# Patient Record
Sex: MALE | Race: WHITE | Employment: PART TIME | URBAN - METROPOLITAN AREA
[De-identification: names, ages, dates, MRNs, and addresses within clinical notes are randomized per-mention and may not be internally consistent; named-entity substitution may affect disease eponyms.]

---

## 2018-06-10 ENCOUNTER — HOSPITAL ENCOUNTER (EMERGENCY)
Age: 38
Discharge: HOME | End: 2018-06-10
Attending: EMERGENCY MEDICINE | Admitting: EMERGENCY MEDICINE
Payer: COMMERCIAL

## 2018-06-10 ENCOUNTER — APPOINTMENT (OUTPATIENT)
Dept: GENERAL RADIOLOGY | Age: 38
End: 2018-06-10
Attending: EMERGENCY MEDICINE
Payer: COMMERCIAL

## 2018-06-10 ENCOUNTER — APPOINTMENT (OUTPATIENT)
Dept: CT IMAGING | Age: 38
End: 2018-06-10
Attending: EMERGENCY MEDICINE
Payer: COMMERCIAL

## 2018-06-10 VITALS
DIASTOLIC BLOOD PRESSURE: 88 MMHG | WEIGHT: 220 LBS | HEART RATE: 69 BPM | TEMPERATURE: 98.9 F | HEIGHT: 68 IN | BODY MASS INDEX: 33.34 KG/M2 | SYSTOLIC BLOOD PRESSURE: 137 MMHG | OXYGEN SATURATION: 97 % | RESPIRATION RATE: 18 BRPM

## 2018-06-10 DIAGNOSIS — R07.9 CHEST PAIN, UNSPECIFIED TYPE: Primary | ICD-10-CM

## 2018-06-10 LAB
ALBUMIN SERPL-MCNC: 3.8 G/DL (ref 3.5–5)
ALBUMIN/GLOB SERPL: 1 {RATIO} (ref 1.1–2.2)
ALP SERPL-CCNC: 90 U/L (ref 45–117)
ALT SERPL-CCNC: 32 U/L (ref 12–78)
ANION GAP SERPL CALC-SCNC: 8 MMOL/L (ref 5–15)
AST SERPL-CCNC: 16 U/L (ref 15–37)
BASOPHILS # BLD: 0.1 K/UL (ref 0–0.1)
BASOPHILS NFR BLD: 0 % (ref 0–1)
BILIRUB SERPL-MCNC: 0.5 MG/DL (ref 0.2–1)
BUN SERPL-MCNC: 15 MG/DL (ref 6–20)
BUN/CREAT SERPL: 13 (ref 12–20)
CALCIUM SERPL-MCNC: 8.9 MG/DL (ref 8.5–10.1)
CHLORIDE SERPL-SCNC: 106 MMOL/L (ref 97–108)
CO2 SERPL-SCNC: 26 MMOL/L (ref 21–32)
CREAT SERPL-MCNC: 1.15 MG/DL (ref 0.7–1.3)
DIFFERENTIAL METHOD BLD: ABNORMAL
EOSINOPHIL # BLD: 0.3 K/UL (ref 0–0.4)
EOSINOPHIL NFR BLD: 2 % (ref 0–7)
ERYTHROCYTE [DISTWIDTH] IN BLOOD BY AUTOMATED COUNT: 12.9 % (ref 11.5–14.5)
GLOBULIN SER CALC-MCNC: 3.7 G/DL (ref 2–4)
GLUCOSE SERPL-MCNC: 117 MG/DL (ref 65–100)
HCT VFR BLD AUTO: 35.4 % (ref 36.6–50.3)
HGB BLD-MCNC: 12.5 G/DL (ref 12.1–17)
IMM GRANULOCYTES # BLD: 0.1 K/UL (ref 0–0.04)
IMM GRANULOCYTES NFR BLD AUTO: 1 % (ref 0–0.5)
LYMPHOCYTES # BLD: 3.6 K/UL (ref 0.8–3.5)
LYMPHOCYTES NFR BLD: 32 % (ref 12–49)
MCH RBC QN AUTO: 30.9 PG (ref 26–34)
MCHC RBC AUTO-ENTMCNC: 35.3 G/DL (ref 30–36.5)
MCV RBC AUTO: 87.6 FL (ref 80–99)
MONOCYTES # BLD: 0.9 K/UL (ref 0–1)
MONOCYTES NFR BLD: 8 % (ref 5–13)
NEUTS SEG # BLD: 6.5 K/UL (ref 1.8–8)
NEUTS SEG NFR BLD: 57 % (ref 32–75)
NRBC # BLD: 0 K/UL (ref 0–0.01)
NRBC BLD-RTO: 0 PER 100 WBC
PLATELET # BLD AUTO: 260 K/UL (ref 150–400)
PMV BLD AUTO: 10.4 FL (ref 8.9–12.9)
POTASSIUM SERPL-SCNC: 3.7 MMOL/L (ref 3.5–5.1)
PROT SERPL-MCNC: 7.5 G/DL (ref 6.4–8.2)
RBC # BLD AUTO: 4.04 M/UL (ref 4.1–5.7)
SODIUM SERPL-SCNC: 140 MMOL/L (ref 136–145)
TROPONIN I SERPL-MCNC: <0.04 NG/ML
WBC # BLD AUTO: 11.3 K/UL (ref 4.1–11.1)

## 2018-06-10 PROCEDURE — 85025 COMPLETE CBC W/AUTO DIFF WBC: CPT | Performed by: EMERGENCY MEDICINE

## 2018-06-10 PROCEDURE — 71275 CT ANGIOGRAPHY CHEST: CPT

## 2018-06-10 PROCEDURE — 93005 ELECTROCARDIOGRAM TRACING: CPT

## 2018-06-10 PROCEDURE — 74011250636 HC RX REV CODE- 250/636: Performed by: EMERGENCY MEDICINE

## 2018-06-10 PROCEDURE — 99285 EMERGENCY DEPT VISIT HI MDM: CPT

## 2018-06-10 PROCEDURE — 84484 ASSAY OF TROPONIN QUANT: CPT | Performed by: EMERGENCY MEDICINE

## 2018-06-10 PROCEDURE — 80053 COMPREHEN METABOLIC PANEL: CPT | Performed by: EMERGENCY MEDICINE

## 2018-06-10 PROCEDURE — 96374 THER/PROPH/DIAG INJ IV PUSH: CPT

## 2018-06-10 PROCEDURE — 36415 COLL VENOUS BLD VENIPUNCTURE: CPT | Performed by: EMERGENCY MEDICINE

## 2018-06-10 PROCEDURE — 74011636320 HC RX REV CODE- 636/320: Performed by: RADIOLOGY

## 2018-06-10 PROCEDURE — 71046 X-RAY EXAM CHEST 2 VIEWS: CPT

## 2018-06-10 RX ORDER — DICLOFENAC SODIUM 75 MG/1
75 TABLET, DELAYED RELEASE ORAL 2 TIMES DAILY
Qty: 30 TAB | Refills: 0 | Status: SHIPPED | OUTPATIENT
Start: 2018-06-10

## 2018-06-10 RX ORDER — CYCLOBENZAPRINE HCL 10 MG
10 TABLET ORAL
Qty: 20 TAB | Refills: 0 | Status: SHIPPED | OUTPATIENT
Start: 2018-06-10

## 2018-06-10 RX ORDER — KETOROLAC TROMETHAMINE 30 MG/ML
30 INJECTION, SOLUTION INTRAMUSCULAR; INTRAVENOUS
Status: COMPLETED | OUTPATIENT
Start: 2018-06-10 | End: 2018-06-10

## 2018-06-10 RX ADMIN — KETOROLAC TROMETHAMINE 30 MG: 30 INJECTION, SOLUTION INTRAMUSCULAR at 20:34

## 2018-06-10 RX ADMIN — IOPAMIDOL 84 ML: 755 INJECTION, SOLUTION INTRAVENOUS at 21:05

## 2018-06-10 NOTE — LETTER
[facility] 
[facility] EMERGENCY DEPT 
[address] [facility] [place] [id number] [phone] Work/School Note Date: 6/10/2018 To Whom It May concern: 
 
Samantha Feliz was seen and treated today in the emergency room by the following provider(s): 
Attending Provider: Dona Garcia DO. Samantha Feliz may return to work on 6/12/2018. Sincerely, Dona Garcia DO

## 2018-06-11 LAB
ATRIAL RATE: 72 BPM
CALCULATED P AXIS, ECG09: 40 DEGREES
CALCULATED R AXIS, ECG10: 46 DEGREES
CALCULATED T AXIS, ECG11: 34 DEGREES
DIAGNOSIS, 93000: NORMAL
P-R INTERVAL, ECG05: 198 MS
Q-T INTERVAL, ECG07: 370 MS
QRS DURATION, ECG06: 110 MS
QTC CALCULATION (BEZET), ECG08: 405 MS
VENTRICULAR RATE, ECG03: 72 BPM

## 2018-06-11 NOTE — ED NOTES
Pt reported upper back pain began yesterday, followed by the chest pain. Denies nausea/vomiting, but does report shortness of breath. Pain increases when lying down in a supine position. Denies increased pain with movement. Pt does report daily marijuana use. Denies any chronic medical problems.

## 2018-06-11 NOTE — ED PROVIDER NOTES
HPI Comments: Patient is a 30year old male without any past medical history or past surgical history significant for  who presents ambulatory to the ED with a chief complaint of non-radiating, rt sided chest pain onset yesterday. Pt states that he began having cp yesterday and also c/o back pain. He reports that his cp is exacerbated with movement and palpation. Pt denies n/v, but reports sob. He states his pain is exacerbated with lying flat. Of note, pt reports daily marijuana use. No other medical complaints or acute sx at this time. Signed by: robert Becker for Wendy Baker on Mayelin 10, 2018. The history is provided by the patient. A  was used. History reviewed. No pertinent past medical history. Past Surgical History:   Procedure Laterality Date    HX ORTHOPAEDIC      left knee meniscus repair, ACL repair         History reviewed. No pertinent family history. Social History     Social History    Marital status: SINGLE     Spouse name: N/A    Number of children: N/A    Years of education: N/A     Occupational History    Not on file. Social History Main Topics    Smoking status: Former Smoker     Quit date: 9/10/2015    Smokeless tobacco: Never Used    Alcohol use 4.8 oz/week     8 Cans of beer per week      Comment: once/week    Drug use: Yes     Special: Marijuana      Comment: \"daily\"    Sexual activity: Not on file     Other Topics Concern    Not on file     Social History Narrative    No narrative on file     ALLERGIES: Review of patient's allergies indicates no known allergies. Review of Systems   Constitutional: Negative for chills and fever. Respiratory: Positive for shortness of breath. Cardiovascular: Positive for chest pain. Gastrointestinal: Negative for abdominal pain, constipation, diarrhea and vomiting. Musculoskeletal: Positive for back pain. Negative for neck pain.    Neurological: Negative for dizziness and light-headedness. All other systems reviewed and are negative. Vitals:    06/10/18 2033 06/10/18 2045 06/10/18 2111 06/10/18 2112   BP:  127/86 137/88    Pulse: 65 78  69   Resp:       Temp:       SpO2: 100% 96%  97%   Weight:       Height:                Physical Exam   Constitutional: He is oriented to person, place, and time. He appears well-developed and well-nourished. No distress. HENT:   Head: Normocephalic and atraumatic. Nose: Nose normal.   Mouth/Throat: Oropharynx is clear and moist.   Eyes: Conjunctivae and EOM are normal. Pupils are equal, round, and reactive to light. No scleral icterus. Neck: Normal range of motion. Neck supple. No JVD present. No tracheal deviation present. No thyromegaly present. No carotid bruits noted. Cardiovascular: Normal rate, regular rhythm, normal heart sounds and intact distal pulses. Exam reveals no gallop and no friction rub. No murmur heard. Pulmonary/Chest: Effort normal and breath sounds normal. No respiratory distress. He has no wheezes. He has no rales. He exhibits tenderness (right anterior chest wall tenderness). Abdominal: Soft. Bowel sounds are normal. He exhibits no distension and no mass. There is no tenderness. There is no rebound and no guarding. Musculoskeletal: Normal range of motion. He exhibits no edema or tenderness. Lymphadenopathy:     He has no cervical adenopathy. Neurological: He is alert and oriented to person, place, and time. He has normal reflexes. No cranial nerve deficit. Coordination normal.   Skin: Skin is warm and dry. No rash noted. No erythema. Psychiatric: He has a normal mood and affect. His behavior is normal. Judgment and thought content normal.   Nursing note and vitals reviewed.        MDM  Number of Diagnoses or Management Options  Chest pain, unspecified type:      Amount and/or Complexity of Data Reviewed  Clinical lab tests: ordered and reviewed  Tests in the radiology section of CPT®: ordered and reviewed  Tests in the medicine section of CPT®: ordered and reviewed  Independent visualization of images, tracings, or specimens: yes (ekg)    Risk of Complications, Morbidity, and/or Mortality  Presenting problems: moderate  Diagnostic procedures: low  Management options: moderate    Patient Progress  Patient progress: stable        ED Course       Procedures    Chief Complaint   Patient presents with    Chest Pain       The patient's presenting problems have been discussed, and they are in agreement with the care plan formulated and outlined with them. I have encouraged them to ask questions as they arise throughout their visit. MEDICATIONS GIVEN:  Medications   ketorolac (TORADOL) injection 30 mg (30 mg IntraVENous Given 6/10/18 2034)   iopamidol (ISOVUE-370) 76 % injection 100 mL (84 mL IntraVENous Given 6/10/18 2105)       LABS REVIEWED:  Recent Results (from the past 24 hour(s))   TROPONIN I    Collection Time: 06/10/18  7:29 PM   Result Value Ref Range    Troponin-I, Qt. <0.04 <0.05 ng/mL   CBC WITH AUTOMATED DIFF    Collection Time: 06/10/18  7:29 PM   Result Value Ref Range    WBC 11.3 (H) 4.1 - 11.1 K/uL    RBC 4.04 (L) 4.10 - 5.70 M/uL    HGB 12.5 12.1 - 17.0 g/dL    HCT 35.4 (L) 36.6 - 50.3 %    MCV 87.6 80.0 - 99.0 FL    MCH 30.9 26.0 - 34.0 PG    MCHC 35.3 30.0 - 36.5 g/dL    RDW 12.9 11.5 - 14.5 %    PLATELET 102 453 - 037 K/uL    MPV 10.4 8.9 - 12.9 FL    NRBC 0.0 0  WBC    ABSOLUTE NRBC 0.00 0.00 - 0.01 K/uL    NEUTROPHILS 57 32 - 75 %    LYMPHOCYTES 32 12 - 49 %    MONOCYTES 8 5 - 13 %    EOSINOPHILS 2 0 - 7 %    BASOPHILS 0 0 - 1 %    IMMATURE GRANULOCYTES 1 (H) 0.0 - 0.5 %    ABS. NEUTROPHILS 6.5 1.8 - 8.0 K/UL    ABS. LYMPHOCYTES 3.6 (H) 0.8 - 3.5 K/UL    ABS. MONOCYTES 0.9 0.0 - 1.0 K/UL    ABS. EOSINOPHILS 0.3 0.0 - 0.4 K/UL    ABS. BASOPHILS 0.1 0.0 - 0.1 K/UL    ABS. IMM.  GRANS. 0.1 (H) 0.00 - 0.04 K/UL    DF AUTOMATED     METABOLIC PANEL, COMPREHENSIVE Collection Time: 06/10/18  7:29 PM   Result Value Ref Range    Sodium 140 136 - 145 mmol/L    Potassium 3.7 3.5 - 5.1 mmol/L    Chloride 106 97 - 108 mmol/L    CO2 26 21 - 32 mmol/L    Anion gap 8 5 - 15 mmol/L    Glucose 117 (H) 65 - 100 mg/dL    BUN 15 6 - 20 MG/DL    Creatinine 1.15 0.70 - 1.30 MG/DL    BUN/Creatinine ratio 13 12 - 20      GFR est AA >60 >60 ml/min/1.73m2    GFR est non-AA >60 >60 ml/min/1.73m2    Calcium 8.9 8.5 - 10.1 MG/DL    Bilirubin, total 0.5 0.2 - 1.0 MG/DL    ALT (SGPT) 32 12 - 78 U/L    AST (SGOT) 16 15 - 37 U/L    Alk. phosphatase 90 45 - 117 U/L    Protein, total 7.5 6.4 - 8.2 g/dL    Albumin 3.8 3.5 - 5.0 g/dL    Globulin 3.7 2.0 - 4.0 g/dL    A-G Ratio 1.0 (L) 1.1 - 2.2         VITAL SIGNS:  Patient Vitals for the past 12 hrs:   Temp Pulse Resp BP SpO2   06/10/18 2112 - 69 - - 97 %   06/10/18 2111 - - - 137/88 -   06/10/18 2045 - 78 - 127/86 96 %   06/10/18 2033 - 65 - - 100 %   06/10/18 2031 - - - 164/76 100 %   06/10/18 1937 98.9 °F (37.2 °C) 73 18 156/83 97 %       RADIOLOGY RESULTS:  The following have been ordered and reviewed:  Xr Chest Pa Lat    Result Date: 6/10/2018  EXAM:  XR CHEST PA LAT INDICATION:  Chest pain. COMPARISON: 1/7/2009 TECHNIQUE: PA and lateral chest views FINDINGS: The cardia mediastinal contours are stable. The pulmonary vasculature is within normal limits. The lungs and pleural spaces are clear. There is no pneumothorax. The bones and upper abdomen are stable. IMPRESSION: No acute process. Stable exam.     Cta Chest W Or W Wo Cont    Result Date: 6/10/2018  EXAM:  CTA CHEST W OR W WO CONT INDICATION:  Chest pain and shortness of breath. COMPARISON: Chest radiographs 6/72/9492 TECHNIQUE: Helical thin section chest CT following uneventful intravenous administration of nonionic contrast according to departmental PE protocol. Coronal and sagittal reformats were performed. 3D/MIP post processing was performed.  CT dose reduction was achieved through use of a standardized protocol tailored for this examination and automatic exposure control for dose modulation. FINDINGS: This is a good quality study for the evaluation of pulmonary embolism to the first subsegmental arterial level. There is no pulmonary embolism to this level. The visualized thyroid gland is unremarkable. The aorta and main pulmonary artery are normal in caliber. Cardiac size is within normal limits. No pericardial effusion. No lymphadenopathy by imaging size criteria. There is mild bilateral lower lung atelectasis. There is no lung nodule, mass, or consolidation. No pleural effusion or pneumothorax. Central airways are unremarkable. Limited images of the upper abdomen are within normal limits. The bony structures are age-appropriate     IMPRESSION: There is no acute pulmonary embolism or other acute abnormality in the chest.       ED EKG interpretation:  Rhythm: normal sinus rhythm; and regular . Rate (approx.): 72; Axis: normal; P wave: normal; QRS interval: normal ; ST/T wave: normal; Other findings: normal. This EKG was interpreted by Harjinder Iraheta DO, ED Provider. PROGRESS NOTES:  Discussed results and plan with patient. Patient will be discharged home with PCP followup. Patient instructed to return to the emergency room for any worsening symptoms or any other concerns. DIAGNOSIS:    1.  Chest pain, unspecified type        PLAN:  Follow-up Information     Follow up With Details Comments Contact Info    Trevor London MD Schedule an appointment as soon as possible for a visit  [address] [place] [address] [address] Mary [address] [place] [address]  [phone]      [facility] EMERGENCY DEPT  If symptoms worsen [address] [place] [address]  [phone]        Discharge Medication List as of 6/10/2018  9:21 PM      START taking these medications    Details   diclofenac EC (VOLTAREN) 75 mg EC tablet Take 1 Tab by mouth two (2) times a day., Print, Disp-30 Tab, R-0      cyclobenzaprine (FLEXERIL) 10 mg tablet Take 1 Tab by mouth three (3) times daily as needed for Muscle Spasm(s). , Print, Disp-20 Tab, R-0             ED COURSE: The patient's hospital course has been uncomplicated.

## 2018-06-11 NOTE — ED NOTES
Pt reports improvement in chest pain at 6/10. Denies shortness of breath. Ambulatory to exit with family at bedside. Pt received discharge papers from provider.

## 2018-06-11 NOTE — DISCHARGE INSTRUCTIONS
We hope that we have addressed all of your medical concerns. The examination and treatment you received in the Emergency Department were for an emergent problem and were not intended as complete care. It is important that you follow up with your healthcare provider(s) for ongoing care. If your symptoms worsen or do not improve as expected, and you are unable to reach your usual health care provider(s), you should return to the Emergency Department. Today's healthcare is undergoing tremendous change, and patient satisfaction surveys are one of the many tools to assess the quality of medical care. You may receive a survey from the [employer] organization regarding your experience in the Emergency Department. I hope that your experience has been completely positive, particularly the medical care that I provided. As such, please participate in the survey; anything less than excellent does not meet my expectations or intentions. [facility] and 8 [facility] participate in nationally recognized quality of care measures. If your blood pressure is greater than 120/80, as reported below, we urge that you seek medical care to address the potential of high blood pressure, commonly known as hypertension. Hypertension can be hereditary or can be caused by certain medical conditions, pain, stress, or \"white coat syndrome. \"       Please make an appointment with your health care provider(s) for follow up of your Emergency Department visit. VITALS:   Patient Vitals for the past 8 hrs:   Temp Pulse Resp BP SpO2   06/10/18 2112 - 69 - - 97 %   06/10/18 2111 - - - 137/88 -   06/10/18 2045 - 78 - 127/86 96 %   06/10/18 2033 - 65 - - 100 %   06/10/18 2031 - - - 164/76 100 %   06/10/18 1937 98.9 °F (37.2 °C) 73 18 156/83 97 %          Thank you for allowing us to provide you with medical care today. We realize that you have many choices for your emergency care needs. Please choose us in the future for any continued health care needs. [address] [place] [address] [address]   Office: [phone]            Recent Results (from the past 24 hour(s))   TROPONIN I    Collection Time: 06/10/18  7:29 PM   Result Value Ref Range    Troponin-I, Qt. <0.04 <0.05 ng/mL   CBC WITH AUTOMATED DIFF    Collection Time: 06/10/18  7:29 PM   Result Value Ref Range    WBC 11.3 (H) 4.1 - 11.1 K/uL    RBC 4.04 (L) 4.10 - 5.70 M/uL    HGB 12.5 12.1 - 17.0 g/dL    HCT 35.4 (L) 36.6 - 50.3 %    MCV 87.6 80.0 - 99.0 FL    MCH 30.9 26.0 - 34.0 PG    MCHC 35.3 30.0 - 36.5 g/dL    RDW 12.9 11.5 - 14.5 %    PLATELET 316 985 - 497 K/uL    MPV 10.4 8.9 - 12.9 FL    NRBC 0.0 0  WBC    ABSOLUTE NRBC 0.00 0.00 - 0.01 K/uL    NEUTROPHILS 57 32 - 75 %    LYMPHOCYTES 32 12 - 49 %    MONOCYTES 8 5 - 13 %    EOSINOPHILS 2 0 - 7 %    BASOPHILS 0 0 - 1 %    IMMATURE GRANULOCYTES 1 (H) 0.0 - 0.5 %    ABS. NEUTROPHILS 6.5 1.8 - 8.0 K/UL    ABS. LYMPHOCYTES 3.6 (H) 0.8 - 3.5 K/UL    ABS. MONOCYTES 0.9 0.0 - 1.0 K/UL    ABS. EOSINOPHILS 0.3 0.0 - 0.4 K/UL    ABS. BASOPHILS 0.1 0.0 - 0.1 K/UL    ABS. IMM. GRANS. 0.1 (H) 0.00 - 0.04 K/UL    DF AUTOMATED     METABOLIC PANEL, COMPREHENSIVE    Collection Time: 06/10/18  7:29 PM   Result Value Ref Range    Sodium 140 136 - 145 mmol/L    Potassium 3.7 3.5 - 5.1 mmol/L    Chloride 106 97 - 108 mmol/L    CO2 26 21 - 32 mmol/L    Anion gap 8 5 - 15 mmol/L    Glucose 117 (H) 65 - 100 mg/dL    BUN 15 6 - 20 MG/DL    Creatinine 1.15 0.70 - 1.30 MG/DL    BUN/Creatinine ratio 13 12 - 20      GFR est AA >60 >60 ml/min/1.73m2    GFR est non-AA >60 >60 ml/min/1.73m2    Calcium 8.9 8.5 - 10.1 MG/DL    Bilirubin, total 0.5 0.2 - 1.0 MG/DL    ALT (SGPT) 32 12 - 78 U/L    AST (SGOT) 16 15 - 37 U/L    Alk.  phosphatase 90 45 - 117 U/L    Protein, total 7.5 6.4 - 8.2 g/dL    Albumin 3.8 3.5 - 5.0 g/dL    Globulin 3.7 2.0 - 4.0 g/dL    A-G Ratio 1.0 (L) 1.1 - 2.2         Xr Chest Pa Lat    Result Date: 6/10/2018  EXAM:  XR CHEST PA LAT INDICATION:  Chest pain. COMPARISON: 1/7/2009 TECHNIQUE: PA and lateral chest views FINDINGS: The cardia mediastinal contours are stable. The pulmonary vasculature is within normal limits. The lungs and pleural spaces are clear. There is no pneumothorax. The bones and upper abdomen are stable. IMPRESSION: No acute process. Stable exam.     Cta Chest W Or W Wo Cont    Result Date: 6/10/2018  EXAM:  CTA CHEST W OR W WO CONT INDICATION:  Chest pain and shortness of breath. COMPARISON: Chest radiographs 1/75/2880 TECHNIQUE: Helical thin section chest CT following uneventful intravenous administration of nonionic contrast according to departmental PE protocol. Coronal and sagittal reformats were performed. 3D/MIP post processing was performed. CT dose reduction was achieved through use of a standardized protocol tailored for this examination and automatic exposure control for dose modulation. FINDINGS: This is a good quality study for the evaluation of pulmonary embolism to the first subsegmental arterial level. There is no pulmonary embolism to this level. The visualized thyroid gland is unremarkable. The aorta and main pulmonary artery are normal in caliber. Cardiac size is within normal limits. No pericardial effusion. No lymphadenopathy by imaging size criteria. There is mild bilateral lower lung atelectasis. There is no lung nodule, mass, or consolidation. No pleural effusion or pneumothorax. Central airways are unremarkable. Limited images of the upper abdomen are within normal limits. The bony structures are age-appropriate     IMPRESSION: There is no acute pulmonary embolism or other acute abnormality in the chest.              Chest Pain: Care Instructions  Your Care Instructions    There are many things that can cause chest pain. Some are not serious and will get better on their own in a few days.  But some kinds of chest pain need more testing and treatment. Your doctor may have recommended a follow-up visit in the next 8 to 12 hours. If you are not getting better, you may need more tests or treatment. Even though your doctor has released you, you still need to watch for any problems. The doctor carefully checked you, but sometimes problems can develop later. If you have new symptoms or if your symptoms do not get better, get medical care right away. If you have worse or different chest pain or pressure that lasts more than 5 minutes or you passed out (lost consciousness), call 911 or seek other emergency help right away. A medical visit is only one step in your treatment. Even if you feel better, you still need to do what your doctor recommends, such as going to all suggested follow-up appointments and taking medicines exactly as directed. This will help you recover and help prevent future problems. How can you care for yourself at home? · Rest until you feel better. · Take your medicine exactly as prescribed. Call your doctor if you think you are having a problem with your medicine. · Do not drive after taking a prescription pain medicine. When should you call for help? Call 911 if:  ? · You passed out (lost consciousness). ? · You have severe difficulty breathing. ? · You have symptoms of a heart attack. These may include:  ¨ Chest pain or pressure, or a strange feeling in your chest.  ¨ Sweating. ¨ Shortness of breath. ¨ Nausea or vomiting. ¨ Pain, pressure, or a strange feeling in your back, neck, jaw, or upper belly or in one or both shoulders or arms. ¨ Lightheadedness or sudden weakness. ¨ A fast or irregular heartbeat. After you call 911, the  may tell you to chew 1 adult-strength or 2 to 4 low-dose aspirin. Wait for an ambulance. Do not try to drive yourself. ?Call your doctor today if:  ? · You have any trouble breathing. ? · Your chest pain gets worse.    ? · You are dizzy or lightheaded, or you feel like you may faint. ? · You are not getting better as expected. ? · You are having new or different chest pain. Where can you learn more? Go to http://claudia-merrill.info/. Enter A120 in the search box to learn more about \"Chest Pain: Care Instructions. \"  Current as of: March 20, 2017  Content Version: 11.4  © 2006-2017 [employer]. Care instructions adapted under license by [employer] (which disclaims liability or warranty for this information). If you have questions about a medical condition or this instruction, always ask your healthcare professional. [place] [address] any warranty or liability for your use of this information. Musculoskeletal Chest Pain: Care Instructions  Your Care Instructions    Chest pain is not always a sign that something is wrong with your heart or that you have another serious problem. The doctor thinks your chest pain is caused by strained muscles or ligaments, inflamed chest cartilage, or another problem in your chest, rather than by your heart. You may need more tests to find the cause of your chest pain. Follow-up care is a key part of your treatment and safety. Be sure to make and go to all appointments, and call your doctor if you are having problems. It's also a good idea to know your test results and keep a list of the medicines you take. How can you care for yourself at home? · Take pain medicines exactly as directed. ¨ If the doctor gave you a prescription medicine for pain, take it as prescribed. ¨ If you are not taking a prescription pain medicine, ask your doctor if you can take an over-the-counter medicine. · Rest and protect the sore area. · Stop, change, or take a break from any activity that may be causing your pain or soreness. · Put ice or a cold pack on the sore area for 10 to 20 minutes at a time.  Try to do this every 1 to 2 hours for the next 3 days (when you are awake) or until the swelling goes down. Put a thin cloth between the ice and your skin. · After 2 or 3 days, apply a heating pad set on low or a warm cloth to the area that hurts. Some doctors suggest that you go back and forth between hot and cold. · Do not wrap or tape your ribs for support. This may cause you to take smaller breaths, which could increase your risk of lung problems. · Mentholated creams such as Bengay or Icy Hot may soothe sore muscles. Follow the instructions on the package. · Follow your doctor's instructions for exercising. · Gentle stretching and massage may help you get better faster. Stretch slowly to the point just before pain begins, and hold the stretch for at least 15 to 30 seconds. Do this 3 or 4 times a day. Stretch just after you have applied heat. · As your pain gets better, slowly return to your normal activities. Any increased pain may be a sign that you need to rest a while longer. When should you call for help? Call 911 anytime you think you may need emergency care. For example, call if:  ? · You have chest pain or pressure. This may occur with:  ¨ Sweating. ¨ Shortness of breath. ¨ Nausea or vomiting. ¨ Pain that spreads from the chest to the neck, jaw, or one or both shoulders or arms. ¨ Dizziness or lightheadedness. ¨ A fast or uneven pulse. After calling 911, chew 1 adult-strength aspirin. Wait for an ambulance. Do not try to drive yourself. ? · You have sudden chest pain and shortness of breath, or you cough up blood. ?Call your doctor now or seek immediate medical care if:  ? · You have any trouble breathing. ? · Your chest pain gets worse. ? · Your chest pain occurs consistently with exercise and is relieved by rest.   ? Watch closely for changes in your health, and be sure to contact your doctor if:  ? · Your chest pain does not get better after 1 week. Where can you learn more? Go to http://claudia-merrill.info/.   Enter J681 in the search box to learn more about \"Musculoskeletal Chest Pain: Care Instructions. \"  Current as of: March 20, 2017  Content Version: 11.4  © 7913-1494 [employer]. Care instructions adapted under license by [employer] (which disclaims liability or warranty for this information). If you have questions about a medical condition or this instruction, always ask your healthcare professional. [place] [address] any warranty or liability for your use of this information.

## 2021-07-12 ENCOUNTER — TRANSCRIBE ORDER (OUTPATIENT)
Dept: SCHEDULING | Age: 41
End: 2021-07-12

## 2021-07-12 DIAGNOSIS — M25.561 RIGHT KNEE PAIN: Primary | ICD-10-CM

## 2021-07-19 ENCOUNTER — HOSPITAL ENCOUNTER (OUTPATIENT)
Dept: MRI IMAGING | Age: 41
Discharge: HOME | End: 2021-07-19
Attending: FAMILY MEDICINE
Payer: COMMERCIAL

## 2021-07-19 DIAGNOSIS — M25.561 RIGHT KNEE PAIN: ICD-10-CM

## 2021-07-19 PROCEDURE — 73721 MRI JNT OF LWR EXTRE W/O DYE: CPT

## 2022-11-22 ENCOUNTER — HOSPITAL ENCOUNTER (EMERGENCY)
Age: 42
Discharge: HOME | End: 2022-11-22
Payer: COMMERCIAL

## 2022-11-22 VITALS
WEIGHT: 250 LBS | RESPIRATION RATE: 18 BRPM | BODY MASS INDEX: 37.89 KG/M2 | HEART RATE: 81 BPM | HEIGHT: 68 IN | OXYGEN SATURATION: 98 % | DIASTOLIC BLOOD PRESSURE: 70 MMHG | TEMPERATURE: 98.2 F | SYSTOLIC BLOOD PRESSURE: 153 MMHG

## 2022-11-22 DIAGNOSIS — S05.02XA ABRASION OF LEFT CORNEA, INITIAL ENCOUNTER: Primary | ICD-10-CM

## 2022-11-22 PROCEDURE — 90714 TD VACC NO PRESV 7 YRS+ IM: CPT | Performed by: PHYSICIAN ASSISTANT

## 2022-11-22 PROCEDURE — 99284 EMERGENCY DEPT VISIT MOD MDM: CPT

## 2022-11-22 PROCEDURE — 74011000250 HC RX REV CODE- 250: Performed by: EMERGENCY MEDICINE

## 2022-11-22 PROCEDURE — 90471 IMMUNIZATION ADMIN: CPT

## 2022-11-22 PROCEDURE — 74011250636 HC RX REV CODE- 250/636: Performed by: PHYSICIAN ASSISTANT

## 2022-11-22 RX ORDER — TETRACAINE HYDROCHLORIDE 5 MG/ML
1 SOLUTION OPHTHALMIC
Status: COMPLETED | OUTPATIENT
Start: 2022-11-22 | End: 2022-11-22

## 2022-11-22 RX ORDER — CIPROFLOXACIN HYDROCHLORIDE 3.5 MG/ML
2 SOLUTION/ DROPS TOPICAL 4 TIMES DAILY
Qty: 5 ML | Refills: 0 | Status: SHIPPED | OUTPATIENT
Start: 2022-11-22 | End: 2022-11-27

## 2022-11-22 RX ADMIN — FLUORESCEIN SODIUM 1 STRIP: 1 STRIP OPHTHALMIC at 14:03

## 2022-11-22 RX ADMIN — TETANUS AND DIPHTHERIA TOXOIDS ADSORBED 0.5 ML: 2; 2 INJECTION INTRAMUSCULAR at 14:41

## 2022-11-22 RX ADMIN — TETRACAINE HYDROCHLORIDE 1 DROP: 5 SOLUTION OPHTHALMIC at 14:03

## 2022-11-22 NOTE — ED PROVIDER NOTES
EMERGENCY DEPARTMENT HISTORY AND PHYSICAL EXAM      Date: 2022  Patient Name: Mabel Rose    History of Presenting Illness     Chief Complaint   Patient presents with    Eye Pain       History Provided By: Patient    HPI: Mabel Rose, 46 y.o. male with no significant past medical history presents this ED with cc of left eye pain. Patient acute onset of pain 1 hour prior to arrival while at work. States that he felt he had sensation of foreign body in left eye and proceeded to rub his eye which exacerbated his pain. States that he also flushed the eye out prior to arrival but continued to have sensation of foreign body and pain. He also complains of watery discharge from the eye. Denies any changes in vision. Denies any discharge, itching, redness. Denies contact lens use. Patient unsure of last tetanus immunization but suspects it was greater than 10 years ago. Notes no additional alleviating or exacerbating factors. Denies treating symptoms with anything. There are no other complaints, changes, or physical findings at this time. Past History     Past Medical History:  History reviewed. No pertinent past medical history. Past Surgical History:  Past Surgical History:   Procedure Laterality Date    HX ORTHOPAEDIC      left knee meniscus repair, ACL repair       Family History:  History reviewed. No pertinent family history. Social History:  Social History     Tobacco Use    Smoking status: Former     Types: Cigarettes     Quit date: 9/10/2015     Years since quittin.2    Smokeless tobacco: Never   Substance Use Topics    Alcohol use: Yes     Alcohol/week: 8.0 standard drinks     Types: 8 Cans of beer per week     Comment: once/week    Drug use: Yes     Types: Marijuana     Comment: \"daily\"       Allergies:  No Known Allergies    PCP: Wil Gross PA-C    No current facility-administered medications on file prior to encounter.      Current Outpatient Medications on File Prior to Encounter   Medication Sig Dispense Refill    [DISCONTINUED] diclofenac EC (VOLTAREN) 75 mg EC tablet Take 1 Tab by mouth two (2) times a day. 30 Tab 0    [DISCONTINUED] cyclobenzaprine (FLEXERIL) 10 mg tablet Take 1 Tab by mouth three (3) times daily as needed for Muscle Spasm(s). 20 Tab 0       Review of Systems   Review of Systems   Constitutional: Negative. Negative for chills, diaphoresis and fever. HENT: Negative. Negative for congestion, rhinorrhea and sore throat. Eyes:  Positive for pain and discharge. Negative for photophobia, redness, itching and visual disturbance. Respiratory: Negative. Negative for cough, chest tightness, shortness of breath and wheezing. Cardiovascular: Negative. Negative for chest pain and palpitations. Gastrointestinal: Negative. Negative for abdominal pain, diarrhea, nausea and vomiting. Genitourinary: Negative. Negative for difficulty urinating, dysuria, frequency and hematuria. Musculoskeletal: Negative. Skin: Negative. Negative for rash. Neurological: Negative. Negative for dizziness, syncope, weakness, light-headedness, numbness and headaches. Psychiatric/Behavioral: Negative. All other systems reviewed and are negative. Physical Exam   Physical Exam  Vitals and nursing note reviewed. Constitutional:       General: He is not in acute distress. Appearance: Normal appearance. He is obese. He is not ill-appearing or toxic-appearing. HENT:      Head: Normocephalic and atraumatic. Mouth/Throat:      Mouth: Mucous membranes are moist.   Eyes:      General: Lids are everted, no foreign bodies appreciated. Vision grossly intact. Left eye: Discharge (clear) present. No foreign body. Extraocular Movements: Extraocular movements intact. Left eye: Normal extraocular motion. Conjunctiva/sclera: Conjunctivae normal.      Left eye: Left conjunctiva is not injected. No exudate.      Pupils: Pupils are equal, round, and reactive to light. Left eye: Corneal abrasion (4 o'clock position) and fluorescein uptake present. Gianna exam negative. Slit lamp exam:     Left eye: No corneal ulcer or foreign body. Cardiovascular:      Rate and Rhythm: Normal rate and regular rhythm. Pulses: Normal pulses. Heart sounds: Normal heart sounds. No murmur heard. No friction rub. No gallop. Pulmonary:      Effort: Pulmonary effort is normal. No respiratory distress. Breath sounds: Normal breath sounds. No wheezing, rhonchi or rales. Musculoskeletal:      Cervical back: Neck supple. No tenderness. Lymphadenopathy:      Cervical: No cervical adenopathy. Skin:     General: Skin is warm and dry. Findings: No rash. Neurological:      General: No focal deficit present. Mental Status: He is alert and oriented to person, place, and time. Lab and Diagnostic Study Results   Labs -   No results found for this or any previous visit (from the past 12 hour(s)). Radiologic Studies -   @lastxrresult@  CT Results  (Last 48 hours)      None          CXR Results  (Last 48 hours)      None            Medical Decision Making and ED Course   Differential Diagnosis & Medical Decision Making Provider Note:   Ddx: Corneal abrasion versus ulceration, foreign body, iritis, conjunctivitis, keratitis    - I am the first provider for this patient. I reviewed the vital signs, available nursing notes, past medical history, past surgical history, family history and social history. The patients presenting problems have been discussed, and they are in agreement with the care plan formulated and outlined with them. I have encouraged them to ask questions as they arise throughout their visit. Vital Signs-Reviewed the patient's vital signs.   Patient Vitals for the past 12 hrs:   Temp Pulse Resp BP SpO2   11/22/22 1358 -- -- -- (!) 153/70 --   11/22/22 1355 98.2 °F (36.8 °C) 81 18 -- 98 %          ALCOHOL/SUBSTANCE ABUSE COUNSELING: Upon evaluation, pt endorsed recent alcohol/illicit drug use. For approximately 15 minutes, pt has been counseled on the dangers of alcohol and illicit drug use on their health, and they were encouraged to quit as soon as possible in order to decrease further risks to their health. Pt has conveyed their understanding of the risks involved should they continue to use these products. Procedures   Performed by: Shakir Perry PA-C  Procedures    Procedure Note - Wood's lamp exam:  2:39 PM  Performed by: Shakir Perry PA-C  Pts left eye was anesthetized with tetracaine, stained with fluorescein, and examined with a Wood's lamp, using lid eversion. Foreign body: no  Fluorescein uptake: yes, showing corneal abrasion at the 4 o'clock position  The procedure took 1-15 minutes, and pt tolerated well. Disposition   Disposition: DC- Adult Discharges: All of the diagnostic tests were reviewed and questions answered. Diagnosis, care plan and treatment options were discussed. The patient understands the instructions and will follow up as directed. The patients results have been reviewed with them. They have been counseled regarding their diagnosis. The patient verbally convey understanding and agreement of the signs, symptoms, diagnosis, treatment and prognosis and additionally agrees to follow up as recommended with their PCP in 24 - 48 hours. They also agree with the care-plan and convey that all of their questions have been answered. I have also put together some discharge instructions for them that include: 1) educational information regarding their diagnosis, 2) how to care for their diagnosis at home, as well a 3) list of reasons why they would want to return to the ED prior to their follow-up appointment, should their condition change. DISCHARGE PLAN:  1. There are no discharge medications for this patient.     2.   Follow-up Information       Follow up With Specialties Details Why [address] [facility]  Schedule an appointment as soon as possible for a visit  As needed if symptoms persist [address] [facility] [address]    [address] [facility]  If symptoms worsen [address]  [phone]          3. Return to ED if worse   4. Current Discharge Medication List        START taking these medications    Details   ciprofloxacin HCl (CILOXAN) 0.3 % ophthalmic solution Administer 2 Drops to left eye four (4) times daily for 5 days. Qty: 5 mL, Refills: 0  Start date: 11/22/2022, End date: 11/27/2022           Remove if admitted/transferred    Diagnosis/Clinical Impression     Clinical Impression:   1. Abrasion of left cornea, initial encounter        Attestations: Delfino RAMOS PA-C, am the primary clinician of record. Please note that this dictation was completed with [employer], the computer voice recognition software. Quite often unanticipated grammatical, syntax, homophones, and other interpretive errors are inadvertently transcribed by the computer software. Please disregard these errors. Please excuse any errors that have escaped final proofreading. Thank you.

## 2022-11-22 NOTE — Clinical Note
[address] EMERGENCY DEPARTMENT  [address] [address]  [phone]    Work/School Note    Date: 11/22/2022    To Whom It May concern:    Osvaldo Okeefe was seen and treated today in the emergency room by the following provider(s):  Physician Assistant: John Ennis PA-C. Yuli Okeefe is excused from work/school on 11/22/22 and 11/23/22. He is medically clear to return to work/school on 11/24/2022.        Sincerely,          Rahul Andre PA-C

## 2023-07-09 ENCOUNTER — HOSPITAL ENCOUNTER (EMERGENCY)
Facility: HOSPITAL | Age: 43
Discharge: HOME | End: 2023-07-09
Attending: EMERGENCY MEDICINE
Payer: COMMERCIAL

## 2023-07-09 ENCOUNTER — APPOINTMENT (OUTPATIENT)
Facility: HOSPITAL | Age: 43
End: 2023-07-09
Payer: COMMERCIAL

## 2023-07-09 VITALS
HEIGHT: 68 IN | TEMPERATURE: 98.2 F | SYSTOLIC BLOOD PRESSURE: 150 MMHG | RESPIRATION RATE: 17 BRPM | DIASTOLIC BLOOD PRESSURE: 85 MMHG | WEIGHT: 248.46 LBS | HEART RATE: 85 BPM | BODY MASS INDEX: 37.66 KG/M2 | OXYGEN SATURATION: 98 %

## 2023-07-09 DIAGNOSIS — H60.392 INFECTIVE OTITIS EXTERNA OF LEFT EAR: Primary | ICD-10-CM

## 2023-07-09 LAB
ALBUMIN SERPL-MCNC: 3.9 G/DL (ref 3.5–5)
ALBUMIN/GLOB SERPL: 0.9 (ref 1.1–2.2)
ALP SERPL-CCNC: 89 U/L (ref 45–117)
ALT SERPL-CCNC: 53 U/L (ref 12–78)
ANION GAP SERPL CALC-SCNC: 12 MMOL/L (ref 5–15)
AST SERPL-CCNC: 17 U/L (ref 15–37)
BASOPHILS # BLD: 0.1 K/UL (ref 0–0.1)
BASOPHILS NFR BLD: 1 % (ref 0–1)
BILIRUB SERPL-MCNC: 0.8 MG/DL (ref 0.2–1)
BUN SERPL-MCNC: 15 MG/DL (ref 6–20)
BUN/CREAT SERPL: 14 (ref 12–20)
CALCIUM SERPL-MCNC: 9.3 MG/DL (ref 8.5–10.1)
CHLORIDE SERPL-SCNC: 102 MMOL/L (ref 97–108)
CO2 SERPL-SCNC: 26 MMOL/L (ref 21–32)
CREAT SERPL-MCNC: 1.06 MG/DL (ref 0.7–1.3)
DIFFERENTIAL METHOD BLD: ABNORMAL
EOSINOPHIL # BLD: 0.2 K/UL (ref 0–0.4)
EOSINOPHIL NFR BLD: 2 % (ref 0–7)
ERYTHROCYTE [DISTWIDTH] IN BLOOD BY AUTOMATED COUNT: 12.5 % (ref 11.5–14.5)
GLOBULIN SER CALC-MCNC: 4.5 G/DL (ref 2–4)
GLUCOSE SERPL-MCNC: 146 MG/DL (ref 65–100)
HCT VFR BLD AUTO: 39.9 % (ref 36.6–50.3)
HGB BLD-MCNC: 13.8 G/DL (ref 12.1–17)
IMM GRANULOCYTES # BLD AUTO: 0.1 K/UL (ref 0–0.04)
IMM GRANULOCYTES NFR BLD AUTO: 1 % (ref 0–0.5)
LYMPHOCYTES # BLD: 1.9 K/UL (ref 0.8–3.5)
LYMPHOCYTES NFR BLD: 18 % (ref 12–49)
MCH RBC QN AUTO: 30.3 PG (ref 26–34)
MCHC RBC AUTO-ENTMCNC: 34.6 G/DL (ref 30–36.5)
MCV RBC AUTO: 87.5 FL (ref 80–99)
MONOCYTES # BLD: 1.1 K/UL (ref 0–1)
MONOCYTES NFR BLD: 11 % (ref 5–13)
NEUTS SEG # BLD: 7.1 K/UL (ref 1.8–8)
NEUTS SEG NFR BLD: 68 % (ref 32–75)
NRBC # BLD: 0 K/UL (ref 0–0.01)
NRBC BLD-RTO: 0 PER 100 WBC
PLATELET # BLD AUTO: 219 K/UL (ref 150–400)
PMV BLD AUTO: 10.3 FL (ref 8.9–12.9)
POTASSIUM SERPL-SCNC: 4.4 MMOL/L (ref 3.5–5.1)
PROT SERPL-MCNC: 8.4 G/DL (ref 6.4–8.2)
RBC # BLD AUTO: 4.56 M/UL (ref 4.1–5.7)
SODIUM SERPL-SCNC: 140 MMOL/L (ref 136–145)
WBC # BLD AUTO: 10.4 K/UL (ref 4.1–11.1)

## 2023-07-09 PROCEDURE — 6360000002 HC RX W HCPCS: Performed by: EMERGENCY MEDICINE

## 2023-07-09 PROCEDURE — 99285 EMERGENCY DEPT VISIT HI MDM: CPT

## 2023-07-09 PROCEDURE — 2580000003 HC RX 258: Performed by: EMERGENCY MEDICINE

## 2023-07-09 PROCEDURE — 96365 THER/PROPH/DIAG IV INF INIT: CPT

## 2023-07-09 PROCEDURE — 96375 TX/PRO/DX INJ NEW DRUG ADDON: CPT

## 2023-07-09 PROCEDURE — 80053 COMPREHEN METABOLIC PANEL: CPT

## 2023-07-09 PROCEDURE — 85025 COMPLETE CBC W/AUTO DIFF WBC: CPT

## 2023-07-09 PROCEDURE — 36415 COLL VENOUS BLD VENIPUNCTURE: CPT

## 2023-07-09 PROCEDURE — 6360000004 HC RX CONTRAST MEDICATION: Performed by: EMERGENCY MEDICINE

## 2023-07-09 PROCEDURE — 70481 CT ORBIT/EAR/FOSSA W/DYE: CPT

## 2023-07-09 PROCEDURE — 6370000000 HC RX 637 (ALT 250 FOR IP): Performed by: EMERGENCY MEDICINE

## 2023-07-09 RX ORDER — CIPROFLOXACIN AND FLUOCINOLONE ACETONIDE .75; .0625 MG/.25ML; MG/.25ML
0.25 SOLUTION AURICULAR (OTIC)
Status: DISCONTINUED | OUTPATIENT
Start: 2023-07-09 | End: 2023-07-09 | Stop reason: HOSPADM

## 2023-07-09 RX ORDER — DEXAMETHASONE SODIUM PHOSPHATE 4 MG/ML
10 INJECTION, SOLUTION INTRA-ARTICULAR; INTRALESIONAL; INTRAMUSCULAR; INTRAVENOUS; SOFT TISSUE ONCE
Status: COMPLETED | OUTPATIENT
Start: 2023-07-09 | End: 2023-07-09

## 2023-07-09 RX ORDER — CIPROFLOXACIN AND DEXAMETHASONE 3; 1 MG/ML; MG/ML
4 SUSPENSION/ DROPS AURICULAR (OTIC) 2 TIMES DAILY
Qty: 7.5 ML | Refills: 0 | Status: SHIPPED | OUTPATIENT
Start: 2023-07-09 | End: 2023-07-19

## 2023-07-09 RX ORDER — CLINDAMYCIN HYDROCHLORIDE 150 MG/1
450 CAPSULE ORAL 3 TIMES DAILY
Qty: 90 CAPSULE | Refills: 0 | Status: SHIPPED | OUTPATIENT
Start: 2023-07-09 | End: 2023-07-19

## 2023-07-09 RX ORDER — NEOMYCIN SULFATE, POLYMYXIN B SULFATE AND HYDROCORTISONE 10; 3.5; 1 MG/ML; MG/ML; [USP'U]/ML
SUSPENSION/ DROPS AURICULAR (OTIC)
COMMUNITY
Start: 2023-07-07

## 2023-07-09 RX ORDER — CIPROFLOXACIN 2 MG/ML
400 INJECTION, SOLUTION INTRAVENOUS ONCE
Status: DISCONTINUED | OUTPATIENT
Start: 2023-07-09 | End: 2023-07-09

## 2023-07-09 RX ORDER — IBUPROFEN 800 MG/1
TABLET ORAL
COMMUNITY
Start: 2023-07-07

## 2023-07-09 RX ORDER — CIPROFLOXACIN AND FLUOCINOLONE ACETONIDE .75; .0625 MG/.25ML; MG/.25ML
0.25 SOLUTION AURICULAR (OTIC)
Status: COMPLETED | OUTPATIENT
Start: 2023-07-09 | End: 2023-07-09

## 2023-07-09 RX ORDER — AMOXICILLIN 500 MG/1
CAPSULE ORAL
COMMUNITY
Start: 2023-07-07

## 2023-07-09 RX ADMIN — CEFEPIME 2000 MG: 2 INJECTION, POWDER, FOR SOLUTION INTRAVENOUS at 09:30

## 2023-07-09 RX ADMIN — CIPROFLOXACIN AND FLUOCINOLONE ACETONIDE 0.25 ML: .75; .0625 SOLUTION AURICULAR (OTIC) at 12:22

## 2023-07-09 RX ADMIN — DEXAMETHASONE SODIUM PHOSPHATE 10 MG: 4 INJECTION, SOLUTION INTRAMUSCULAR; INTRAVENOUS at 09:30

## 2023-07-09 RX ADMIN — IOPAMIDOL 100 ML: 612 INJECTION, SOLUTION INTRATHECAL at 10:37

## 2024-03-26 ENCOUNTER — APPOINTMENT (OUTPATIENT)
Facility: HOSPITAL | Age: 44
End: 2024-03-26
Payer: COMMERCIAL

## 2024-03-26 ENCOUNTER — HOSPITAL ENCOUNTER (EMERGENCY)
Facility: HOSPITAL | Age: 44
Discharge: HOME | End: 2024-03-26
Attending: EMERGENCY MEDICINE
Payer: COMMERCIAL

## 2024-03-26 VITALS
TEMPERATURE: 97.5 F | HEIGHT: 68 IN | RESPIRATION RATE: 18 BRPM | SYSTOLIC BLOOD PRESSURE: 138 MMHG | HEART RATE: 72 BPM | BODY MASS INDEX: 37.89 KG/M2 | OXYGEN SATURATION: 98 % | WEIGHT: 250 LBS | DIASTOLIC BLOOD PRESSURE: 75 MMHG

## 2024-03-26 DIAGNOSIS — K76.0 HEPATIC STEATOSIS: ICD-10-CM

## 2024-03-26 DIAGNOSIS — R10.9 FLANK PAIN: Primary | ICD-10-CM

## 2024-03-26 LAB
ALBUMIN SERPL-MCNC: 4 G/DL (ref 3.5–5)
ALBUMIN/GLOB SERPL: 1.1 (ref 1.1–2.2)
ALP SERPL-CCNC: 97 U/L (ref 45–117)
ALT SERPL-CCNC: 116 U/L (ref 12–78)
ANION GAP SERPL CALC-SCNC: 2 MMOL/L (ref 5–15)
AST SERPL W P-5'-P-CCNC: 48 U/L (ref 15–37)
BASOPHILS # BLD: 0.1 K/UL (ref 0–0.1)
BASOPHILS NFR BLD: 1 % (ref 0–1)
BILIRUB SERPL-MCNC: 0.5 MG/DL (ref 0.2–1)
BUN SERPL-MCNC: 16 MG/DL (ref 6–20)
BUN/CREAT SERPL: 15 (ref 12–20)
CA-I BLD-MCNC: 9.1 MG/DL (ref 8.5–10.1)
CHLORIDE SERPL-SCNC: 105 MMOL/L (ref 97–108)
CO2 SERPL-SCNC: 27 MMOL/L (ref 21–32)
CREAT SERPL-MCNC: 1.04 MG/DL (ref 0.7–1.3)
DIFFERENTIAL METHOD BLD: ABNORMAL
EOSINOPHIL # BLD: 0.3 K/UL (ref 0–0.4)
EOSINOPHIL NFR BLD: 4 % (ref 0–7)
ERYTHROCYTE [DISTWIDTH] IN BLOOD BY AUTOMATED COUNT: 12.3 % (ref 11.5–14.5)
GLOBULIN SER CALC-MCNC: 3.5 G/DL (ref 2–4)
GLUCOSE SERPL-MCNC: 253 MG/DL (ref 65–100)
HCT VFR BLD AUTO: 39.8 % (ref 36.6–50.3)
HGB BLD-MCNC: 13.8 G/DL (ref 12.1–17)
IMM GRANULOCYTES # BLD AUTO: 0 K/UL (ref 0–0.04)
IMM GRANULOCYTES NFR BLD AUTO: 1 % (ref 0–0.5)
LIPASE SERPL-CCNC: 56 U/L (ref 13–75)
LYMPHOCYTES # BLD: 3.1 K/UL (ref 0.8–3.5)
LYMPHOCYTES NFR BLD: 44 % (ref 12–49)
MCH RBC QN AUTO: 29.9 PG (ref 26–34)
MCHC RBC AUTO-ENTMCNC: 34.7 G/DL (ref 30–36.5)
MCV RBC AUTO: 86.3 FL (ref 80–99)
MONOCYTES # BLD: 0.4 K/UL (ref 0–1)
MONOCYTES NFR BLD: 6 % (ref 5–13)
NEUTS SEG # BLD: 3 K/UL (ref 1.8–8)
NEUTS SEG NFR BLD: 44 % (ref 32–75)
NRBC # BLD: 0 K/UL (ref 0–0.01)
NRBC BLD-RTO: 0 PER 100 WBC
PLATELET # BLD AUTO: 246 K/UL (ref 150–400)
PMV BLD AUTO: 11 FL (ref 8.9–12.9)
POTASSIUM SERPL-SCNC: 4.8 MMOL/L (ref 3.5–5.1)
PROT SERPL-MCNC: 7.5 G/DL (ref 6.4–8.2)
RBC # BLD AUTO: 4.61 M/UL (ref 4.1–5.7)
SODIUM SERPL-SCNC: 134 MMOL/L (ref 136–145)
WBC # BLD AUTO: 6.8 K/UL (ref 4.1–11.1)

## 2024-03-26 PROCEDURE — 6360000002 HC RX W HCPCS: Performed by: EMERGENCY MEDICINE

## 2024-03-26 PROCEDURE — 83690 ASSAY OF LIPASE: CPT

## 2024-03-26 PROCEDURE — 76705 ECHO EXAM OF ABDOMEN: CPT

## 2024-03-26 PROCEDURE — 36415 COLL VENOUS BLD VENIPUNCTURE: CPT

## 2024-03-26 PROCEDURE — 99284 EMERGENCY DEPT VISIT MOD MDM: CPT

## 2024-03-26 PROCEDURE — 80053 COMPREHEN METABOLIC PANEL: CPT

## 2024-03-26 PROCEDURE — 85025 COMPLETE CBC W/AUTO DIFF WBC: CPT

## 2024-03-26 PROCEDURE — 6370000000 HC RX 637 (ALT 250 FOR IP): Performed by: EMERGENCY MEDICINE

## 2024-03-26 PROCEDURE — 96374 THER/PROPH/DIAG INJ IV PUSH: CPT

## 2024-03-26 RX ORDER — ACETAMINOPHEN 500 MG
500 TABLET ORAL 3 TIMES DAILY PRN
Qty: 20 TABLET | Refills: 1 | Status: SHIPPED | OUTPATIENT
Start: 2024-03-26 | End: 2024-04-02

## 2024-03-26 RX ORDER — KETOROLAC TROMETHAMINE 15 MG/ML
15 INJECTION, SOLUTION INTRAMUSCULAR; INTRAVENOUS ONCE
Status: COMPLETED | OUTPATIENT
Start: 2024-03-26 | End: 2024-03-26

## 2024-03-26 RX ORDER — LIDOCAINE 4 G/G
1 PATCH TOPICAL
Status: DISCONTINUED | OUTPATIENT
Start: 2024-03-26 | End: 2024-03-26 | Stop reason: HOSPADM

## 2024-03-26 RX ORDER — METHOCARBAMOL 750 MG/1
750 TABLET, FILM COATED ORAL 3 TIMES DAILY PRN
Qty: 20 TABLET | Refills: 0 | Status: SHIPPED | OUTPATIENT
Start: 2024-03-26 | End: 2024-04-05

## 2024-03-26 RX ORDER — IBUPROFEN 800 MG/1
800 TABLET ORAL EVERY 8 HOURS PRN
Qty: 20 TABLET | Refills: 1 | Status: SHIPPED | OUTPATIENT
Start: 2024-03-26 | End: 2024-04-02

## 2024-03-26 RX ORDER — LIDOCAINE 4 G/G
1 PATCH TOPICAL DAILY
Qty: 10 PATCH | Refills: 0 | Status: SHIPPED | OUTPATIENT
Start: 2024-03-26 | End: 2024-04-05

## 2024-03-26 RX ADMIN — KETOROLAC TROMETHAMINE 15 MG: 15 INJECTION, SOLUTION INTRAMUSCULAR; INTRAVENOUS at 09:54

## 2024-03-26 ASSESSMENT — LIFESTYLE VARIABLES
HOW OFTEN DO YOU HAVE A DRINK CONTAINING ALCOHOL: NEVER
HOW MANY STANDARD DRINKS CONTAINING ALCOHOL DO YOU HAVE ON A TYPICAL DAY: PATIENT DOES NOT DRINK

## 2024-03-26 ASSESSMENT — PAIN - FUNCTIONAL ASSESSMENT
PAIN_FUNCTIONAL_ASSESSMENT: 0-10
PAIN_FUNCTIONAL_ASSESSMENT: 0-10

## 2024-03-26 ASSESSMENT — PAIN DESCRIPTION - LOCATION: LOCATION: FLANK

## 2024-03-26 ASSESSMENT — PAIN SCALES - GENERAL
PAINLEVEL_OUTOF10: 10
PAINLEVEL_OUTOF10: 4

## 2024-03-26 NOTE — DISCHARGE INSTRUCTIONS
Calcium 9.1 8.5 - 10.1 mg/dL    Total Bilirubin 0.5 0.2 - 1.0 mg/dL    AST 48 (H) 15 - 37 U/L     (H) 12 - 78 U/L    Alk Phosphatase 97 45 - 117 U/L    Total Protein 7.5 6.4 - 8.2 g/dL    Albumin 4.0 3.5 - 5.0 g/dL    Globulin 3.5 2.0 - 4.0 g/dL    Albumin/Globulin Ratio 1.1 1.1 - 2.2     Lipase    Collection Time: 03/26/24  9:25 AM   Result Value Ref Range    Lipase 56 13 - 75 U/L       Radiologic Studies  US GALLBLADDER RUQ   Final Result   1. Hepatic steatosis.              ------------------------------------------------------------------------------------------------------------  The exam and treatment you received in the Emergency Department were for an urgent problem and are not intended as complete care. It is important that you follow-up with a doctor, nurse practitioner, or physician assistant to:  (1) confirm your diagnosis,  (2) re-evaluation of changes in your illness and treatment, and (3) for ongoing care. Please take your discharge instructions with you when you go to your follow-up appointment.     If you have any problem arranging a follow-up appointment, contact the Emergency Department.  If your symptoms become worse or you do not improve as expected and you are unable to reach your health care provider, please return to the Emergency Department. We are available 24 hours a day.     If a prescription has been provided, please have it filled as soon as possible to prevent a delay in treatment. If you have any questions or reservations about taking the medication due to side effects or interactions with other medications, please call your primary care provider or contact the ER.

## 2024-03-26 NOTE — ED PROVIDER NOTES
Hernia: No hernia is present.      Comments: Tender over right flank extending over posterolateral ribs   Musculoskeletal:         General: Normal range of motion.   Skin:     General: Skin is warm and dry.      Capillary Refill: Capillary refill takes less than 2 seconds.   Neurological:      General: No focal deficit present.      Mental Status: He is alert. Mental status is at baseline.            SCREENINGS                  LAB, EKG AND DIAGNOSTIC RESULTS   Labs:  Recent Results (from the past 12 hour(s))   CBC with Auto Differential    Collection Time: 03/26/24  9:25 AM   Result Value Ref Range    WBC 6.8 4.1 - 11.1 K/uL    RBC 4.61 4.10 - 5.70 M/uL    Hemoglobin 13.8 12.1 - 17.0 g/dL    Hematocrit 39.8 36.6 - 50.3 %    MCV 86.3 80.0 - 99.0 FL    MCH 29.9 26.0 - 34.0 PG    MCHC 34.7 30.0 - 36.5 g/dL    RDW 12.3 11.5 - 14.5 %    Platelets 246 150 - 400 K/uL    MPV 11.0 8.9 - 12.9 FL    Nucleated RBCs 0.0 0.0  WBC    nRBC 0.00 0.00 - 0.01 K/uL    Neutrophils % 44 32 - 75 %    Lymphocytes % 44 12 - 49 %    Monocytes % 6 5 - 13 %    Eosinophils % 4 0 - 7 %    Basophils % 1 0 - 1 %    Immature Granulocytes 1 (H) 0 - 0.5 %    Neutrophils Absolute 3.0 1.8 - 8.0 K/UL    Lymphocytes Absolute 3.1 0.8 - 3.5 K/UL    Monocytes Absolute 0.4 0.0 - 1.0 K/UL    Eosinophils Absolute 0.3 0.0 - 0.4 K/UL    Basophils Absolute 0.1 0.0 - 0.1 K/UL    Absolute Immature Granulocyte 0.0 0.00 - 0.04 K/UL    Differential Type AUTOMATED     CMP    Collection Time: 03/26/24  9:25 AM   Result Value Ref Range    Sodium 134 (L) 136 - 145 mmol/L    Potassium 4.8 3.5 - 5.1 mmol/L    Chloride 105 97 - 108 mmol/L    CO2 27 21 - 32 mmol/L    Anion Gap 2 (L) 5 - 15 mmol/L    Glucose 253 (H) 65 - 100 mg/dL    BUN 16 6 - 20 mg/dL    Creatinine 1.04 0.70 - 1.30 mg/dL    Bun/Cre Ratio 15 12 - 20      Est, Glom Filt Rate >90 >60 ml/min/1.73m2    Calcium 9.1 8.5 - 10.1 mg/dL    Total Bilirubin 0.5 0.2 - 1.0 mg/dL    AST 48 (H) 15 - 37 U/L